# Patient Record
Sex: FEMALE | Race: OTHER
[De-identification: names, ages, dates, MRNs, and addresses within clinical notes are randomized per-mention and may not be internally consistent; named-entity substitution may affect disease eponyms.]

---

## 2019-01-13 ENCOUNTER — HOSPITAL ENCOUNTER (EMERGENCY)
Dept: HOSPITAL 72 - EMR | Age: 54
Discharge: HOME | End: 2019-01-13
Payer: MEDICAID

## 2019-01-13 VITALS — SYSTOLIC BLOOD PRESSURE: 126 MMHG | DIASTOLIC BLOOD PRESSURE: 81 MMHG

## 2019-01-13 VITALS — BODY MASS INDEX: 27.48 KG/M2 | HEIGHT: 60 IN | WEIGHT: 140 LBS

## 2019-01-13 DIAGNOSIS — S61.012A: Primary | ICD-10-CM

## 2019-01-13 DIAGNOSIS — Z88.0: ICD-10-CM
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
## 2019-01-13 PROCEDURE — 99283 EMERGENCY DEPT VISIT LOW MDM: CPT

## 2019-01-13 NOTE — EMERGENCY ROOM REPORT
History of Present Illness


General


Chief Complaint:  Laceration


Source:  Patient, Medical Record





Present Illness


HPI


Patient presents with complaints of bleeding from her laceration on the left 

thumb


Reports that yesterday approximately 4 in the afternoon while emptying a can


She lacerated her left thumb on the edge


The bleeding took some time to control


And look to be doing better however this morning again noted bleeding from the 

area and presents to the ER


Denies any wrist pain denies any other trauma there is discomfort to the thumb 

itself


The lacerated area 4 out of 10


Allergies:  


Coded Allergies:  


     SUMATRIPTAN (Verified  Allergy, Severe, 10/24/18)


 hypertension





Patient History


Past Medical History:  see triage record


Pertinent Family History:  none


Reviewed Nursing Documentation:  PMH: Agreed; PSxH: Agreed





Review of Systems


All Other Systems:  negative except mentioned in HPI





Physical Exam





Vital Signs








  Date Time  Temp Pulse Resp B/P (MAP) Pulse Ox O2 Delivery O2 Flow Rate FiO2


 


1/13/19 09:25 97.9 77 22 126/81 96 Room Air  








Sp02 EP Interpretation:  reviewed, normal


General Appearance:  no apparent distress


Head:  normocephalic, atraumatic


Eyes:  bilateral eye PERRL, bilateral eye EOMI


ENT:  normal pharynx


Neck:  full range of motion, supple


Respiratory:  lungs clear


Cardiovascular #1:  regular rate, rhythm


Gastrointestinal:  non tender, soft


Musculoskeletal:  normal inspection - Able to fully flex and extend against 

resistance on her left thumb


Neurologic:  alert, oriented x3, responsive


Skin:  other - Approximately 1 cm secondarily healing laceration involving the 

palmar aspect of the left thumb no obvious secondary hematomas


Lymphatic:  no adenopathy





Procedures


Laceration/Wound Repair


Laceration/Wound Repair :  


   Consent:  Verbal


   Wound Location:  upper extremity


   Wound's Depth, Shape:  superficial


   Wound Length (cm):  1


   Wound Explored:  clean


   Wound Debrided:  minimal


   Wound Repaired With:  Dermabond


   Patient Tolerated:  Well


   Complications:  None


Progress


The area in question has secondary healing already in process after further 

cleansing Dermabond is applied for further appropriate coverage.  The wound 

does not fully approximate however it does provide appropriate approximation 

with Dermabond coverage and patient has close outpatient follow-up





Medical Decision Making


Diagnostic Impression:  


 Primary Impression:  


 Laceration


ER Course


The area is cleansed and dressed as above patient tolerated the procedure well








Reports that she feels like she is up-to-date with immunizations


I will have initial conservative outpatient trial





Last Vital Signs








  Date Time  Temp Pulse Resp B/P (MAP) Pulse Ox O2 Delivery O2 Flow Rate FiO2


 


1/13/19 09:42 97.9  22 126/81 96 Room Air  


 


1/13/19 09:25  77      








Status:  improved


Disposition:  HOME, SELF-CARE


Condition:  Improved


Referrals:  


PATRICE ORTIZ,REFERRING (PCP)





Additional Instructions:  


Patient is provided with the discharge instructions notified to follow up with 

primary doctor in the next 2-3 days otherwise return to the er with any 

worsening symptoms.


Please note that this report is being documented using DRAGON technology.  This 

can lead to erroneous entry secondary to incorrect interpretation by the 

dictating instrument.











Rahel Adams DO Jan 13, 2019 10:10

## 2019-01-13 NOTE — NUR
ED Nurse Note:pt. cut her inner left thumb last night and it still bleeding on 
arrival to ER, seen by ER MD

## 2019-01-13 NOTE — NUR
ED Nurse Note:dry dressing was applied then pt. received d/c instructions and 
left ER with steady gait